# Patient Record
Sex: MALE | Race: BLACK OR AFRICAN AMERICAN | Employment: UNEMPLOYED | URBAN - METROPOLITAN AREA
[De-identification: names, ages, dates, MRNs, and addresses within clinical notes are randomized per-mention and may not be internally consistent; named-entity substitution may affect disease eponyms.]

---

## 2024-11-20 ENCOUNTER — APPOINTMENT (OUTPATIENT)
Dept: MRI IMAGING | Facility: HOSPITAL | Age: 49
End: 2024-11-20
Attending: STUDENT IN AN ORGANIZED HEALTH CARE EDUCATION/TRAINING PROGRAM
Payer: MEDICAID

## 2024-11-20 ENCOUNTER — APPOINTMENT (OUTPATIENT)
Dept: CT IMAGING | Facility: HOSPITAL | Age: 49
DRG: 057 | End: 2024-11-20
Attending: STUDENT IN AN ORGANIZED HEALTH CARE EDUCATION/TRAINING PROGRAM
Payer: MEDICAID

## 2024-11-20 ENCOUNTER — HOSPITAL ENCOUNTER (INPATIENT)
Facility: HOSPITAL | Age: 49
LOS: 1 days | Discharge: HOME | DRG: 057 | End: 2024-11-21
Attending: STUDENT IN AN ORGANIZED HEALTH CARE EDUCATION/TRAINING PROGRAM | Admitting: HOSPITALIST
Payer: MEDICAID

## 2024-11-20 ENCOUNTER — APPOINTMENT (OUTPATIENT)
Dept: MRI IMAGING | Facility: HOSPITAL | Age: 49
DRG: 057 | End: 2024-11-20
Attending: STUDENT IN AN ORGANIZED HEALTH CARE EDUCATION/TRAINING PROGRAM
Payer: MEDICAID

## 2024-11-20 ENCOUNTER — APPOINTMENT (OUTPATIENT)
Dept: CT IMAGING | Facility: HOSPITAL | Age: 49
End: 2024-11-20
Attending: STUDENT IN AN ORGANIZED HEALTH CARE EDUCATION/TRAINING PROGRAM
Payer: MEDICAID

## 2024-11-20 ENCOUNTER — HOSPITAL ENCOUNTER (INPATIENT)
Facility: HOSPITAL | Age: 49
LOS: 1 days | Discharge: HOME | End: 2024-11-21
Attending: STUDENT IN AN ORGANIZED HEALTH CARE EDUCATION/TRAINING PROGRAM | Admitting: HOSPITALIST
Payer: MEDICAID

## 2024-11-20 DIAGNOSIS — R27.0 ATAXIA OF RIGHT UPPER EXTREMITY: Primary | ICD-10-CM

## 2024-11-20 LAB
ANION GAP SERPL CALC-SCNC: 6 MMOL/L (ref 0–18)
BASOPHILS # BLD AUTO: 0.04 X10(3) UL (ref 0–0.2)
BASOPHILS NFR BLD AUTO: 0.6 %
BUN BLD-MCNC: 14 MG/DL (ref 9–23)
BUN/CREAT SERPL: 12.3 (ref 10–20)
CALCIUM BLD-MCNC: 9.7 MG/DL (ref 8.7–10.4)
CHLORIDE SERPL-SCNC: 100 MMOL/L (ref 98–112)
CO2 SERPL-SCNC: 27 MMOL/L (ref 21–32)
CREAT BLD-MCNC: 1.14 MG/DL
DEPRECATED RDW RBC AUTO: 37.2 FL (ref 35.1–46.3)
EGFRCR SERPLBLD CKD-EPI 2021: 79 ML/MIN/1.73M2 (ref 60–?)
EOSINOPHIL # BLD AUTO: 0.09 X10(3) UL (ref 0–0.7)
EOSINOPHIL NFR BLD AUTO: 1.5 %
ERYTHROCYTE [DISTWIDTH] IN BLOOD BY AUTOMATED COUNT: 13 % (ref 11–15)
EST. AVERAGE GLUCOSE BLD GHB EST-MCNC: 378 MG/DL (ref 68–126)
GLUCOSE BLD-MCNC: 375 MG/DL (ref 70–99)
GLUCOSE BLDC GLUCOMTR-MCNC: 325 MG/DL (ref 70–99)
GLUCOSE BLDC GLUCOMTR-MCNC: 330 MG/DL (ref 70–99)
HBA1C MFR BLD: 14.8 % (ref ?–5.7)
HCT VFR BLD AUTO: 39 %
HGB BLD-MCNC: 13.3 G/DL
IMM GRANULOCYTES # BLD AUTO: 0.01 X10(3) UL (ref 0–1)
IMM GRANULOCYTES NFR BLD: 0.2 %
LYMPHOCYTES # BLD AUTO: 1.85 X10(3) UL (ref 1–4)
LYMPHOCYTES NFR BLD AUTO: 29.9 %
MCH RBC QN AUTO: 27 PG (ref 26–34)
MCHC RBC AUTO-ENTMCNC: 34.1 G/DL (ref 31–37)
MCV RBC AUTO: 79.1 FL
MONOCYTES # BLD AUTO: 0.51 X10(3) UL (ref 0.1–1)
MONOCYTES NFR BLD AUTO: 8.3 %
NEUTROPHILS # BLD AUTO: 3.68 X10 (3) UL (ref 1.5–7.7)
NEUTROPHILS # BLD AUTO: 3.68 X10(3) UL (ref 1.5–7.7)
NEUTROPHILS NFR BLD AUTO: 59.5 %
OSMOLALITY SERPL CALC.SUM OF ELEC: 292 MOSM/KG (ref 275–295)
PLATELET # BLD AUTO: 330 10(3)UL (ref 150–450)
POTASSIUM SERPL-SCNC: 4.3 MMOL/L (ref 3.5–5.1)
RBC # BLD AUTO: 4.93 X10(6)UL
SODIUM SERPL-SCNC: 133 MMOL/L (ref 136–145)
WBC # BLD AUTO: 6.2 X10(3) UL (ref 4–11)

## 2024-11-20 PROCEDURE — 82962 GLUCOSE BLOOD TEST: CPT

## 2024-11-20 PROCEDURE — 80048 BASIC METABOLIC PNL TOTAL CA: CPT | Performed by: STUDENT IN AN ORGANIZED HEALTH CARE EDUCATION/TRAINING PROGRAM

## 2024-11-20 PROCEDURE — 93005 ELECTROCARDIOGRAM TRACING: CPT

## 2024-11-20 PROCEDURE — 83036 HEMOGLOBIN GLYCOSYLATED A1C: CPT | Performed by: HOSPITALIST

## 2024-11-20 PROCEDURE — 36415 COLL VENOUS BLD VENIPUNCTURE: CPT

## 2024-11-20 PROCEDURE — 70553 MRI BRAIN STEM W/O & W/DYE: CPT | Performed by: STUDENT IN AN ORGANIZED HEALTH CARE EDUCATION/TRAINING PROGRAM

## 2024-11-20 PROCEDURE — 99285 EMERGENCY DEPT VISIT HI MDM: CPT

## 2024-11-20 PROCEDURE — A9575 INJ GADOTERATE MEGLUMI 0.1ML: HCPCS | Performed by: STUDENT IN AN ORGANIZED HEALTH CARE EDUCATION/TRAINING PROGRAM

## 2024-11-20 PROCEDURE — 93010 ELECTROCARDIOGRAM REPORT: CPT

## 2024-11-20 PROCEDURE — 70496 CT ANGIOGRAPHY HEAD: CPT | Performed by: STUDENT IN AN ORGANIZED HEALTH CARE EDUCATION/TRAINING PROGRAM

## 2024-11-20 PROCEDURE — 85025 COMPLETE CBC W/AUTO DIFF WBC: CPT | Performed by: STUDENT IN AN ORGANIZED HEALTH CARE EDUCATION/TRAINING PROGRAM

## 2024-11-20 PROCEDURE — 70498 CT ANGIOGRAPHY NECK: CPT | Performed by: STUDENT IN AN ORGANIZED HEALTH CARE EDUCATION/TRAINING PROGRAM

## 2024-11-20 PROCEDURE — 70551 MRI BRAIN STEM W/O DYE: CPT | Performed by: STUDENT IN AN ORGANIZED HEALTH CARE EDUCATION/TRAINING PROGRAM

## 2024-11-20 RX ORDER — ACETAMINOPHEN 325 MG/1
650 TABLET ORAL EVERY 4 HOURS PRN
Status: DISCONTINUED | OUTPATIENT
Start: 2024-11-20 | End: 2024-11-21

## 2024-11-20 RX ORDER — METFORMIN HYDROCHLORIDE 500 MG/1
1000 TABLET, EXTENDED RELEASE ORAL 2 TIMES DAILY WITH MEALS
COMMUNITY
Start: 2023-06-13

## 2024-11-20 RX ORDER — LISINOPRIL 10 MG/1
10 TABLET ORAL DAILY
Status: DISCONTINUED | OUTPATIENT
Start: 2024-11-20 | End: 2024-11-21

## 2024-11-20 RX ORDER — GADOTERATE MEGLUMINE 376.9 MG/ML
20 INJECTION INTRAVENOUS
Status: COMPLETED | OUTPATIENT
Start: 2024-11-20 | End: 2024-11-20

## 2024-11-20 RX ORDER — POLYETHYLENE GLYCOL 3350 17 G/17G
17 POWDER, FOR SOLUTION ORAL DAILY PRN
Status: DISCONTINUED | OUTPATIENT
Start: 2024-11-20 | End: 2024-11-21

## 2024-11-20 RX ORDER — ACETAMINOPHEN 500 MG
500 TABLET ORAL EVERY 4 HOURS PRN
Status: DISCONTINUED | OUTPATIENT
Start: 2024-11-20 | End: 2024-11-21

## 2024-11-20 RX ORDER — ENOXAPARIN SODIUM 100 MG/ML
40 INJECTION SUBCUTANEOUS DAILY
Status: DISCONTINUED | OUTPATIENT
Start: 2024-11-21 | End: 2024-11-21

## 2024-11-20 RX ORDER — ORAL SEMAGLUTIDE 7 MG/1
7 TABLET ORAL DAILY
COMMUNITY
Start: 2024-11-19

## 2024-11-20 RX ORDER — LISINOPRIL 10 MG/1
10 TABLET ORAL DAILY
COMMUNITY

## 2024-11-20 RX ORDER — GLIPIZIDE 5 MG/1
5 TABLET ORAL
COMMUNITY

## 2024-11-20 RX ORDER — SENNOSIDES 8.6 MG
17.2 TABLET ORAL NIGHTLY PRN
Status: DISCONTINUED | OUTPATIENT
Start: 2024-11-20 | End: 2024-11-21

## 2024-11-20 RX ORDER — NICOTINE POLACRILEX 4 MG
15 LOZENGE BUCCAL
Status: DISCONTINUED | OUTPATIENT
Start: 2024-11-20 | End: 2024-11-21

## 2024-11-20 RX ORDER — SODIUM CHLORIDE 9 MG/ML
INJECTION, SOLUTION INTRAVENOUS CONTINUOUS
Status: DISCONTINUED | OUTPATIENT
Start: 2024-11-20 | End: 2024-11-21

## 2024-11-20 RX ORDER — ONDANSETRON 2 MG/ML
4 INJECTION INTRAMUSCULAR; INTRAVENOUS EVERY 6 HOURS PRN
Status: DISCONTINUED | OUTPATIENT
Start: 2024-11-20 | End: 2024-11-21

## 2024-11-20 RX ORDER — HYDROCODONE BITARTRATE AND ACETAMINOPHEN 5; 325 MG/1; MG/1
2 TABLET ORAL EVERY 4 HOURS PRN
Status: DISCONTINUED | OUTPATIENT
Start: 2024-11-20 | End: 2024-11-21

## 2024-11-20 RX ORDER — DEXTROSE MONOHYDRATE 25 G/50ML
50 INJECTION, SOLUTION INTRAVENOUS
Status: DISCONTINUED | OUTPATIENT
Start: 2024-11-20 | End: 2024-11-21

## 2024-11-20 RX ORDER — PANTOPRAZOLE SODIUM 20 MG/1
20 TABLET, DELAYED RELEASE ORAL DAILY
COMMUNITY
Start: 2024-09-05

## 2024-11-20 RX ORDER — HYDROCODONE BITARTRATE AND ACETAMINOPHEN 5; 325 MG/1; MG/1
1 TABLET ORAL EVERY 4 HOURS PRN
Status: DISCONTINUED | OUTPATIENT
Start: 2024-11-20 | End: 2024-11-21

## 2024-11-20 RX ORDER — NICOTINE POLACRILEX 4 MG
30 LOZENGE BUCCAL
Status: DISCONTINUED | OUTPATIENT
Start: 2024-11-20 | End: 2024-11-21

## 2024-11-20 RX ORDER — PROCHLORPERAZINE EDISYLATE 5 MG/ML
5 INJECTION INTRAMUSCULAR; INTRAVENOUS EVERY 8 HOURS PRN
Status: DISCONTINUED | OUTPATIENT
Start: 2024-11-20 | End: 2024-11-21

## 2024-11-20 RX ORDER — BISACODYL 10 MG
10 SUPPOSITORY, RECTAL RECTAL
Status: DISCONTINUED | OUTPATIENT
Start: 2024-11-20 | End: 2024-11-21

## 2024-11-20 RX ORDER — PANTOPRAZOLE SODIUM 20 MG/1
20 TABLET, DELAYED RELEASE ORAL DAILY
Status: DISCONTINUED | OUTPATIENT
Start: 2024-11-20 | End: 2024-11-21

## 2024-11-20 NOTE — ED INITIAL ASSESSMENT (HPI)
Pt arrives ambulatory to ED for R arm weakness/issue. Pt states he is \"having difficulty controlling my right arm\" intermittently since Saturday. Pt denies numbness/weakness. Aox4, speaking in full sentences.

## 2024-11-21 VITALS
OXYGEN SATURATION: 99 % | RESPIRATION RATE: 16 BRPM | TEMPERATURE: 99 F | BODY MASS INDEX: 37.33 KG/M2 | DIASTOLIC BLOOD PRESSURE: 73 MMHG | SYSTOLIC BLOOD PRESSURE: 132 MMHG | HEIGHT: 69 IN | WEIGHT: 252 LBS | HEART RATE: 89 BPM

## 2024-11-21 LAB
ANION GAP SERPL CALC-SCNC: 6 MMOL/L (ref 0–18)
ATRIAL RATE: 91 BPM
BASOPHILS # BLD AUTO: 0.02 X10(3) UL (ref 0–0.2)
BASOPHILS NFR BLD AUTO: 0.3 %
BUN BLD-MCNC: 13 MG/DL (ref 9–23)
BUN/CREAT SERPL: 14 (ref 10–20)
CALCIUM BLD-MCNC: 9.1 MG/DL (ref 8.7–10.4)
CHLORIDE SERPL-SCNC: 103 MMOL/L (ref 98–112)
CO2 SERPL-SCNC: 27 MMOL/L (ref 21–32)
CREAT BLD-MCNC: 0.93 MG/DL
DEPRECATED RDW RBC AUTO: 37.3 FL (ref 35.1–46.3)
EGFRCR SERPLBLD CKD-EPI 2021: 101 ML/MIN/1.73M2 (ref 60–?)
EOSINOPHIL # BLD AUTO: 0.09 X10(3) UL (ref 0–0.7)
EOSINOPHIL NFR BLD AUTO: 1.6 %
ERYTHROCYTE [DISTWIDTH] IN BLOOD BY AUTOMATED COUNT: 12.9 % (ref 11–15)
GLUCOSE BLD-MCNC: 308 MG/DL (ref 70–99)
GLUCOSE BLDC GLUCOMTR-MCNC: 277 MG/DL (ref 70–99)
GLUCOSE BLDC GLUCOMTR-MCNC: 294 MG/DL (ref 70–99)
HCT VFR BLD AUTO: 34.5 %
HGB BLD-MCNC: 12.2 G/DL
IMM GRANULOCYTES # BLD AUTO: 0.01 X10(3) UL (ref 0–1)
IMM GRANULOCYTES NFR BLD: 0.2 %
LYMPHOCYTES # BLD AUTO: 1.84 X10(3) UL (ref 1–4)
LYMPHOCYTES NFR BLD AUTO: 31.7 %
MCH RBC QN AUTO: 28.3 PG (ref 26–34)
MCHC RBC AUTO-ENTMCNC: 35.4 G/DL (ref 31–37)
MCV RBC AUTO: 80 FL
MONOCYTES # BLD AUTO: 0.56 X10(3) UL (ref 0.1–1)
MONOCYTES NFR BLD AUTO: 9.7 %
NEUTROPHILS # BLD AUTO: 3.28 X10 (3) UL (ref 1.5–7.7)
NEUTROPHILS # BLD AUTO: 3.28 X10(3) UL (ref 1.5–7.7)
NEUTROPHILS NFR BLD AUTO: 56.5 %
OSMOLALITY SERPL CALC.SUM OF ELEC: 294 MOSM/KG (ref 275–295)
P AXIS: 29 DEGREES
P-R INTERVAL: 160 MS
PLATELET # BLD AUTO: 274 10(3)UL (ref 150–450)
POTASSIUM SERPL-SCNC: 4 MMOL/L (ref 3.5–5.1)
Q-T INTERVAL: 356 MS
QRS DURATION: 98 MS
QTC CALCULATION (BEZET): 437 MS
R AXIS: -40 DEGREES
RBC # BLD AUTO: 4.31 X10(6)UL
SODIUM SERPL-SCNC: 136 MMOL/L (ref 136–145)
T AXIS: 1 DEGREES
VENTRICULAR RATE: 91 BPM
WBC # BLD AUTO: 5.8 X10(3) UL (ref 4–11)

## 2024-11-21 PROCEDURE — 97161 PT EVAL LOW COMPLEX 20 MIN: CPT

## 2024-11-21 PROCEDURE — 80048 BASIC METABOLIC PNL TOTAL CA: CPT | Performed by: HOSPITALIST

## 2024-11-21 PROCEDURE — 82962 GLUCOSE BLOOD TEST: CPT

## 2024-11-21 PROCEDURE — 97530 THERAPEUTIC ACTIVITIES: CPT

## 2024-11-21 PROCEDURE — 85025 COMPLETE CBC W/AUTO DIFF WBC: CPT | Performed by: HOSPITALIST

## 2024-11-21 PROCEDURE — 97165 OT EVAL LOW COMPLEX 30 MIN: CPT

## 2024-11-21 PROCEDURE — 97116 GAIT TRAINING THERAPY: CPT

## 2024-11-21 PROCEDURE — 95819 EEG AWAKE AND ASLEEP: CPT

## 2024-11-21 RX ORDER — INSULIN DEGLUDEC 100 U/ML
5 INJECTION, SOLUTION SUBCUTANEOUS NIGHTLY
Status: DISCONTINUED | OUTPATIENT
Start: 2024-11-21 | End: 2024-11-21

## 2024-11-21 NOTE — DISCHARGE SUMMARY
[facility]   INTERNAL MEDICINE DISCHARGE SUMMARY    Name:     Marek Mcqueen  YOB: 1975  MRN:     [id number]    Admission Date:     11/20/2024 Discharge Date:     11/21/24      REASON FOR ADMISSION  Ataxia of right upper extremity    Primary care physician: No primary care provider on file.   Discharging physician: Stevie Sawant MD     HOSPITAL COURSE  DISCHARGE DIAGNOSES  PROCEDURES   Mr. Marek Mcqueen is a 50 y/o M w/ T2DM, HTN, HLD, JAZZ, and GERD who presented for weakness and decreased coordination     Patient following with primary care and endocrinology at [place]. Was recently seen in clinic for dizziness. Thought potentially due to anxiety/panic attack, possibly blood sugar (unsure since patient did not check during event). Dizziness resolved, continued to have on and off difficulty with right arm, sometimes feeling like his coordination was slightly off. Resolves spontaneously after happening. This had not occurred until a few days prior to admission. On admission - got MRI brain wihtout acute findings explaining symptoms. CTA head neck without LVO or significant stenosis. Neuro consulted,  completed EEG without seizure like activity. Was recommended to get LP per neuro, however patient declined at this time. Unclear cause of symptoms, patient prefers to follow up.     Of note sugars were elevated during admission. Discussed with patient, has endocrinology appointment 11/25/24. Emphasized importance of glucose control. Instructed to follow up with PCP, neuro, and endocrine.     Decreased coordination  - recent episode of dizziness PTA  - on and off decreased coordination causing him to present to ED  - unclear cause after workup - stroke and seizure workup completed per neuro without acute cause:  - MRI brain without acute hemorrhage or infarct  - CTA head/neck without occlusion or significant stenosis  - eeg without seizure activity  - neuro consulted   - would also recommend LP,  patient declines   - follow up OP  - monitored on telemetry  - PT/OT ordered - OP Therapy recommended     T2DM c/b hyperglycemia  - A1c 14.8 on admission  - on glipizide, metformin, rybelus  - SSI inpatient , start glargine if remains inpatient  - due to continued elevation in sugars, would recommend starting insulin - patient to discuss with his endocrinologist 11/25 at appointment     Chronic medical conditions:  - HTN: continue lisinopril  - HLD: crestor  - JAZZ: CPAP  - GERD: PPI    CONSULTATIONS   IP CONSULT TO NEUROLOGY    IMPORTANT FOLLOW UP  Marisol Agustin  [address] [facility] [address]  [phone]    Schedule an appointment as soon as possible for a visit  Diabetes follow up within 1-3 weeks    Arnold Moore MD  [facility] [place] [address]   [phone]  Schedule an appointment as soon as possible for a visit in 1 week(s)       Discharge Instructions         Mr Mcqueen,    Zach were treated for decreased coordination.    Your MRI's did not show any abnormalities    Please continue your medications as prescribed.    Please follow up with PCP and neurology.    Your blood sugars were elevated - you will need further control with your endocrinologist. Please follow up with them as scheduled on 11/25.              MEDICATIONS AT DISCHARGE     Medication List        CONTINUE taking these medications      glipiZIDE 5 MG Tabs  Commonly known as: Glucotrol     lisinopril 10 MG Tabs  Commonly known as: Zestril     metFORMIN  MG Tb24  Commonly known as: Glucophage XR     pantoprazole 20 MG Tbec  Commonly known as: Protonix     Rybelsus 7 MG Tabs  Generic drug: Semaglutide          PHYSICAL EXAMINATION  Vitals: BP (!) 152/92 (BP Location: Left arm)   Pulse 89   Temp 98.5 °F (36.9 °C) (Oral)   Resp 18   Ht 5' 9\" (1.753 m)   Wt 252 lb (114.3 kg)   SpO2 97%   BMI 37.21 kg/m²   Please see H&P    DISCHARGE CONDITION  Stable    DISCHARGE  LOCATION  home    DISCHARGE DIAGNOSES  Weakness  HTN  HLD  T2DM with hyperglycemia      Total time coordinating care: 75 minutes. (Hx, exam, chart review, discussing plan w/ patient/family, nursing/consultants). 50% of the time F2F for counseling, patient education, answering questions & coordination of care.    Patient's prior to admission medications were reviewed, all current medications reviewed. Patient's current and discharge medications have been reconciled and reviewed verbally with the patient and or family members.      Stevie Sawant MD   Internal Medicine - [facility]      Objective:    LABORATORY VALUES   Recent Labs   Lab 11/20/24  1651 11/21/24  0539   * 136   K 4.3 4.0    103   CO2 27.0 27.0   BUN 14 13   CREATSERUM 1.14 0.93   ANIONGAP 6 6   * 308*   CA 9.7 9.1   EGFRCR 79 101    Recent Labs   Lab 11/20/24  1651 11/21/24  0539   WBC 6.2 5.8   HGB 13.3 12.2*   HCT 39.0 34.5*   .0 274.0   MCV 79.1* 80.0   MOPERCENT 8.3 9.7   EOPERCENT 1.5 1.6   BAPERCENT 0.6 0.3      IMAGING, REPORTS, AND DIAGNOSTICS  MRI BRAIN (W+WO) (CPT=70553)    Result Date: 11/20/2024  CONCLUSION:  1. Normal pre and post intravenous gadolinium MRI scan of the brain. 2. Mild ethmoid sinusitis-probably chronic    Dictated by (CST): Moises Borrego MD on 11/20/2024 at 9:44 PM     Finalized by (CST): Moises Borrego MD on 11/20/2024 at 9:51 PM          CTA BRAIN + CTA CAROTIDS (CPT=70496/58155)    Result Date: 11/20/2024  CONCLUSION:  1. No major vessel occlusion, hemodynamically significant stenosis, dissection, AVM or aneurysm. 2. Mild atherosclerosis of internal carotids.  No hemodynamically significant stenosis. 3. No acute intracranial finding. 4. Mild right ethmoid sinusitis.     Dictated by (CST): Moises Borrego MD on 11/20/2024 at 6:57 PM     Finalized by (CST): Moises Borrego MD on 11/20/2024 at 7:04 PM          MRI BRAIN WO ACUTE (3) SEQUENCE (CPT=70551)    Result Date:  11/20/2024  CONCLUSION:  1. No acute infarct or hemorrhage. 2. Probable artifact related to tortuous vertebral artery accounting for inconsistent signal abnormality in the left side of the medulla.  If patient has persistent unexplained symptoms, follow-up complete pre and post intravenous gadolinium MRI scan recommended for confirmation.     Dictated by (CST): Moises Borrego MD on 11/20/2024 at 6:52 PM     Finalized by (CST): Moises Borrego MD on 11/20/2024 at 6:57 PM

## 2024-11-21 NOTE — ED QUICK NOTES
Rounding Completed    Plan of Care reviewed. Waiting for Admission.  Elimination needs assessed.  Patient resting in bed, speaking in full sentences. Pt on cardiac monitor for frequent VS assessments, NSR. No new requests at this time.     Bed is locked and in lowest position. Call light within reach.

## 2024-11-21 NOTE — DISCHARGE INSTRUCTIONS
Mr Richar,    You were treated for decreased coordination.    Your MRI's did not show any abnormalities    Please continue your medications as prescribed.    Please follow up with PCP and neurology.    Your blood sugars were elevated - you will need further control with your endocrinologist. Please follow up with them as scheduled on 11/25.

## 2024-11-21 NOTE — ED QUICK NOTES
This RN received report from Sara HOLT.    Rounding Completed    Plan of Care reviewed. Waiting for Mri results.  Elimination needs assessed.  Patient resting in bed, speaking in full sentences. Pt on cardiac monitor, for frequent VS assessments, NSR. No new requests at this time.  Respiratory effort good, even and unlabored. IV in place    Bed is locked and in lowest position. Call light within reach.

## 2024-11-21 NOTE — RESPIRATORY THERAPY NOTE
Patient to be placed on CPAP : No  Patient refused Yes    Comments:   Patient wears CPAP at home but would not like to wear one during his hospital stay. I let the patient know that if he changes his mind during his stay the CPAP is always available to him.

## 2024-11-21 NOTE — ED QUICK NOTES
Orders for admission, patient is aware of plan and ready to go upstairs. Any questions, please call ED RN Kimberley at extension [phone].     Patient Covid vaccination status: Fully vaccinated     COVID Test Ordered in ED: None    COVID Suspicion at Admission: N/A    Running Infusions:  None    Mental Status/LOC at time of transport: Aox4    Other pertinent information:   CIWA score: N/A   NIH score:  N/A

## 2024-11-21 NOTE — CM/SW NOTE
11/21/24 1500   Discharge disposition   Expected discharge disposition Home or Self   Outpatient services Outpatient rehab services   Discharge transportation Private car     Per chart, pt has DC order for today.    SW requested RN have MD enter order/Rx for Outpatient PT prior to pt leaving.    Pt is cleared from SW/CM stand point.      PLAN: Home w/ Outpatient PT          MIRIAM Medina, LSW [id number]

## 2024-11-21 NOTE — PROCEDURES
Procedure: 21-channel EEG  Referring Provider: Self  Reason for Study: R/o seizures  EKG: Yes      Date of Service: 11/21  Start: 14:13  End: 14:35  Duration 22 minutes      EEG Description:    Technical:  This is a routine  EEG obtained with standard placement of scalp electrodes utilizing the International 10-20 Electrode Placement system. The record was reviewed using digital reformatting with bipolar and referential montages.    Background:    Occipital rhythm (posterior dominant rhythm, or PDR): 11Hz         Organization: good    Reactivity to eye opening/closure:  Present  Other background activity:  None  Drowsiness: Yes  Sleep: Stage II  Comments:     Activation Procedures:    A. Hyperventilation: None  B. Photic stimulation: None  C. Reactivity to stimulation: None    Abnormalities:   Normal    EEG Diagnosis:   Normal awake and asleep EEG

## 2024-11-21 NOTE — PLAN OF CARE
Marek is A&Ox4 and on room air. Pt ambulates and voids independently, monitoring blood glucose levels per order- ACHS. Pt is saline locked and is tolerating diet. Patient denies pain. Frequent rounding by nursing staff. Safety precautions maintained/call light within reach. Patient discharged home with spouse and outpatient PT orders. IV removed, discharge education provided, patient sent with all personal belongings, and discharge instructions. Addressed additional questions.   Problem: Patient Centered Care  Goal: Patient preferences are identified and integrated in the patient's plan of care  Description: Interventions:  - What would you like us to know as we care for you? My right arm is weak.  - Provide timely, complete, and accurate information to patient/family  - Incorporate patient and family knowledge, values, beliefs, and cultural backgrounds into the planning and delivery of care  - Encourage patient/family to participate in care and decision-making at the level they choose  - Honor patient and family perspectives and choices  Outcome: Adequate for Discharge     Problem: Patient/Family Goals  Goal: Patient/Family Long Term Goal  Description: Patient's Long Term Goal: no more weakness    Interventions:  - pt/ot  - See additional Care Plan goals for specific interventions  Outcome: Adequate for Discharge  Goal: Patient/Family Short Term Goal  Description: Patient's Short Term Goal: go home    Interventions:   - increased strength   - See additional Care Plan goals for specific interventions  Outcome: Adequate for Discharge     Problem: SAFETY ADULT - FALL  Goal: Free from fall injury  Description: INTERVENTIONS:  - Assess pt frequently for physical needs  - Identify cognitive and physical deficits and behaviors that affect risk of falls.  - [place] fall precautions as indicated by assessment.  - Educate pt/family on patient safety including physical limitations  - Instruct pt to call for assistance with  activity based on assessment  - Modify environment to reduce risk of injury  - Provide assistive devices as appropriate  - Consider OT/PT consult to assist with strengthening/mobility  - Encourage toileting schedule  Outcome: Adequate for Discharge     Problem: MUSCULOSKELETAL - ADULT  Goal: Return mobility to safest level of function  Description: INTERVENTIONS:  - Assess patient stability and activity tolerance for standing, transferring and ambulating w/ or w/o assistive devices  - Assist with transfers and ambulation using safe patient handling equipment as needed  - Ensure adequate protection for wounds/incisions during mobilization  - Obtain PT/OT consults as needed  - Advance activity as appropriate  - Communicate ordered activity level and limitations with patient/family  Outcome: Adequate for Discharge  Goal: Maintain proper alignment of affected body part  Description: INTERVENTIONS:  - Support and protect limb and body alignment per provider's orders  - Instruct and reinforce with patient and family use of appropriate assistive device and precautions (e.g. spinal or hip dislocation precautions)  Outcome: Adequate for Discharge

## 2024-11-21 NOTE — ED QUICK NOTES
Mri approves previous MRI screening form from today for new MRI order, per MRI tech, no need to fill out the second form.

## 2024-11-21 NOTE — ED PROVIDER NOTES
Patient Seen in: [facility] 4w/sw/se      History     Chief Complaint   Patient presents with    Weakness    Other     Stated Complaint: Right arm/ sholder problem    Subjective:   HPI    49-year-old male with history of hypertension GERD diabetes mellitus presenting for evaluation of difficulty using right upper extremity.  He is right-hand dominant.  He states that he had intermittent issues since Saturday.  Where he has several minutes of decreased dexterity in the right upper extremity.  Also noticed a similar issue in the right leg which has been intermittent.  Presently he is asymptomatic.  No headache.  No visual changes.  No neck pain.  No falls or trauma to the head.  Is a poorly controlled diabetic.  Has not checked her sugars in the last month.      Objective:     Past Medical History:    Diabetes (HCC)    Esophageal reflux    Essential hypertension              History reviewed. No pertinent surgical history.             Social History     Socioeconomic History    Marital status:    Tobacco Use    Smoking status: Never    Smokeless tobacco: Never     Social Drivers of Health     Financial Resource Strain: Low Risk  (4/30/2024)    Received from [facility]    Overall Financial Resource Strain (CARDIA)     Difficulty of Paying Living Expenses: Not very hard   Food Insecurity: No Food Insecurity (11/20/2024)    Food Insecurity     Food Insecurity: Never true   Transportation Needs: No Transportation Needs (11/20/2024)    Transportation Needs     Lack of Transportation: No   Housing Stability: Low Risk  (11/20/2024)    Housing Stability     Housing Instability: No                  Physical Exam     ED Triage Vitals [11/20/24 1604]   /87   Pulse 93   Resp 18   Temp 97.1 °F (36.2 °C)   Temp src Temporal   SpO2 99 %   O2 Device None (Room air)       Current Vitals:   Vital Signs  BP: (!) 168/99  Pulse: 82  Resp: 20  Temp: 98.4 °F (36.9 °C)  Temp src: Oral  MAP (mmHg):  (!) 117    Oxygen Therapy  SpO2: 98 %  O2 Device: None (Room air)        Physical Exam  Constitutional: awake, alert, no sig distress  HENT: mmm, no lesions,  Neck: normal range of motion, no tenderness, supple.  Eyes: PERRL, EOMI, conjunctiva normal, no discharge. Sclera anicteric.  Cardiovascular: rr no murmur  Respiratory: Normal breath sounds, no respiratory distress, no wheezing, no chest tenderness.  GI: Bowel sounds normal, Soft, no tenderness, no masses, no pulsatile masses.  : No CVA tenderness.  Skin: Warm, dry, no erythema, no rash.  Musculoskeletal: Intact distal pulses, no edema, no tenderness, no cyanosis, no clubbing. Good range of motion in all major joints. No tenderness to palpation or major deformities noted. Back- No tenderness.  Neurologic: Alert & oriented x 3, normal motor function, normal sensory function, no focal deficits noted.  -mild dysmetria on FNF with RUE, normal proprioception to joint position, normal sensation  Psych: Calm, cooperative, nl affect        ED Course     Labs Reviewed   BASIC METABOLIC PANEL (8) - Abnormal; Notable for the following components:       Result Value    Glucose 375 (*)     Sodium 133 (*)     All other components within normal limits   CBC WITH DIFFERENTIAL WITH PLATELET - Abnormal; Notable for the following components:    MCV 79.1 (*)     All other components within normal limits   HEMOGLOBIN A1C - Abnormal; Notable for the following components:    HgbA1C 14.8 (*)     Estimated Average Glucose 378 (*)     All other components within normal limits   POCT GLUCOSE - Abnormal; Notable for the following components:    POC Glucose  325 (*)     All other components within normal limits   BASIC METABOLIC PANEL (8)   CBC WITH DIFFERENTIAL WITH PLATELET     EKG    Rate, intervals and axes as noted on EKG Report.  Rate: 91  Rhythm: Sinus Rhythm  Reading: SR with PAC, left axis, lvh changes, no st change no stemi. Qtc 437                       MDM      49M hx as  above presenting with intermittent decreased coordination and dexterity of the right upper and occasionally right lower extremity.  On arrival he is hypertensive other vitals are stable and reassuring.  DDx includes metabolic derangements, acute ischemic stroke TIA, demyelinating disease such as MS  Plan labs CTA MRI brain acute series      Independently reviewed patient CTA no large vessel occlusion or dissection    MRI without acute stroke, does have signal abnormality in left medulla, which does correlate to patient's symptoms, may be artifactual.  Discussed with neurology who recommends MRI brain without contrast admission.  Discussed with hospitalist.  Admission disposition: 11/20/2024  8:42 PM           Medical Decision Making      Disposition and Plan     Clinical Impression:  1. Ataxia of right upper extremity         Disposition:  Admit  11/20/2024  8:42 pm    Follow-up:  No follow-up provider specified.        Medications Prescribed:  Current Discharge Medication List              Supplementary Documentation:         Hospital Problems       Present on Admission             ICD-10-CM Noted POA    * (Principal) Ataxia of right upper extremity R27.0 11/20/2024 Unknown

## 2024-11-21 NOTE — CM/SW NOTE
Per chart, pt is from home w/ spouse. Pt is on RA w/ stable O2 sats. Pt is also documented as AOX4 and ambulating independently in his room.    SW consulted w/ PT Becky and OT Rama - confirmed therapy needs anticipated at this time.    UPDATE: Per PT note, Anticipated therapy need: Home with Outpatient Rehab    MD to enter order/Rx for Outpatient PT prior to DC.    PLAN: Home w/ Outpatient PT - pending Rx & med clear      SW/CM to remain available for support and/or discharge planning.       Carol, MSW, LSW [id number]

## 2024-11-21 NOTE — H&P
[facility]   INTERNAL MEDICINE HISTORY & PHYSICAL      CHIEF COMPLAINT   Weakness and Other    HISTORY OF THE PRESENT ILLNESS    Mr. Marek Mcqueen is a 50 y/o M w/ T2DM, HTN, HLD, JAZZ, and GERD who presented for weakness and decreased coordination    Patient following with primary care and endocrinology at [place]. Was recently seen in clinic for dizziness. Thought potentially due to anxiety/panic attack, possibly blood sugar (unsure since patient did not check during event). Dizziness resolved, continued to have on and off difficulty with right arm, sometimes feeling like his coordination was slightly off. Resolves spontaneously after happening. This had not occurred until a few days prior to admission.    Seen on admission. Patient feels well when seen. No headache, vision changes, or other weakness. No fever or chills. Has not had recent infections. Has been taking medications as prescribed.     On admission:  - VS: afebrile, HR 84, RR 22, /80  - labs notable for glucose 375, cbc wnl, hgb A1c 14.8  - MRI brain  w/out infarct or hemorrhage, CTA head/neck w/out occlusion or significant stenosis, mild atherosclerosis of internal carotids, MRI brain w+w/out wnl  - neuro consulted    REVIEW OF SYSTEMS   Remainder of 12-point ROS negative unless discussed in HPI.     PATIENT HISTORIES  PMHx:  He has a past medical history of Diabetes (HCC), Esophageal reflux, and Essential hypertension.  PSHx:  He has no past surgical history on file.   FHx:  His family history is not on file.   SHx: He reports that he has never smoked. He has never used smokeless tobacco.    Allergies: He has No Known Allergies.     Current Outpatient Medications   Medication Instructions    glipiZIDE (GLUCOTROL) 5 mg, Every morning before breakfast    lisinopril (ZESTRIL) 10 mg, Daily    metFORMIN ER (GLUCOPHAGE XR) 1,000 mg, 2 times daily with meals    pantoprazole (PROTONIX) 20 mg, Daily    Rybelsus 7 mg, Daily       PHYSICAL  EXAMINATION   Vitals: BP (!) 152/92 (BP Location: Left arm)   Pulse 95   Temp 98.5 °F (36.9 °C) (Oral)   Resp 18   Ht 5' 9\" (1.753 m)   Wt 252 lb (114.3 kg)   SpO2 97%   BMI 37.21 kg/m²   Gen: NAD, well nourished  Eyes: PERRLA, normal conjunctivae  ENMT: Moist mucous membranes, trachea midline, no pharyngeal erythema, no thyromegaly  CV: RRR, no M/R/G, no peripheral edema  Resp: CTAB, non-labored respirations, symmetric expansion  GI: Soft, NT, ND, + BS, no masses, no HSM  MSK:  No C/C/E, normal active/passive ROM in C-spine, 5/5 strength in all extremities  Skin: No rashes, visible skin w/o worrisome lesions   Neuro: CN 2-12 grossly intact, sensation intact   Psych: A&Ox3, appropriate mood, conversant w/ linear thought process     DATA REVIEW: LABORATORY VALUES & DIAGNOSTICS  Recent Labs   Lab 11/20/24  1651 11/21/24  0539   * 136   K 4.3 4.0    103   CO2 27.0 27.0   BUN 14 13   CREATSERUM 1.14 0.93   ANIONGAP 6 6   * 308*   CA 9.7 9.1   EGFRCR 79 101     Recent Labs   Lab 11/20/24  1651 11/21/24  0539   WBC 6.2 5.8   HGB 13.3 12.2*   HCT 39.0 34.5*   .0 274.0   MCV 79.1* 80.0   MOPERCENT 8.3 9.7   EOPERCENT 1.5 1.6   BAPERCENT 0.6 0.3     ASSESSMENT & PLAN   Mr. Marek Mcqueen is a 50 y/o M w/ T2DM, HTN, HLD, JAZZ, and GERD who presented for weakness and decreased coordination    Decreased coordination  C/F TIA  - recent episode of dizziness PTA  - on and off decreased coordination causing him to present to ED  - unclear if due to tia, dehydration, peripheral neuropathy, seizure activity  - MRI brain without acute hemorrhage or infarct  - CTA head/neck without occlusion or significant stenosis  - neuro consulted   - eeg ordered   - patient considering LP  - monitor on telemetry  - PT/OT ordered - OP Therapy recommended    T2DM c/b hyperglycemia  - A1c 14.8 on admission  - on glipizide, metformin, rybelus  - SSI inpatient , start glargine if remains inpatient  - due to continued  elevation in sugars, would recommend starting insulin - patient to discuss with his endocrinologist 11/25 at appointment    Chronic medical conditions:  - HTN: continue lisinopril  - HLD: crestor  - JAZZ: CPAP  - GERD: PPI    Ppx: enox  Dispo  EDoD: ~   pending neurological workup  F/u:   - PCP: No primary care provider on file.     Stevie Sawant MD  Internal Medicine - [place] [facility]ist  [facility]

## 2024-11-21 NOTE — PLAN OF CARE
Patient is alert and oriented x4. Tolerating diet, accucheck ACHS. IVF infusing. Voiding freely. Family at bedside. Call light within reach.    Problem: Patient Centered Care  Goal: Patient preferences are identified and integrated in the patient's plan of care  Description: Interventions:  - What would you like us to know as we care for you? My right arm is weak.  - Provide timely, complete, and accurate information to patient/family  - Incorporate patient and family knowledge, values, beliefs, and cultural backgrounds into the planning and delivery of care  - Encourage patient/family to participate in care and decision-making at the level they choose  - Honor patient and family perspectives and choices  Outcome: Progressing     Problem: Patient/Family Goals  Goal: Patient/Family Long Term Goal  Description: Patient's Long Term Goal: no more weakness    Interventions:  - pt/ot  - See additional Care Plan goals for specific interventions  Outcome: Progressing  Goal: Patient/Family Short Term Goal  Description: Patient's Short Term Goal: go home    Interventions:   - increased strength   - See additional Care Plan goals for specific interventions  Outcome: Progressing     Problem: SAFETY ADULT - FALL  Goal: Free from fall injury  Description: INTERVENTIONS:  - Assess pt frequently for physical needs  - Identify cognitive and physical deficits and behaviors that affect risk of falls.  - [place] fall precautions as indicated by assessment.  - Educate pt/family on patient safety including physical limitations  - Instruct pt to call for assistance with activity based on assessment  - Modify environment to reduce risk of injury  - Provide assistive devices as appropriate  - Consider OT/PT consult to assist with strengthening/mobility  - Encourage toileting schedule  Outcome: Progressing     Problem: MUSCULOSKELETAL - ADULT  Goal: Return mobility to safest level of function  Description: INTERVENTIONS:  - Assess patient  stability and activity tolerance for standing, transferring and ambulating w/ or w/o assistive devices  - Assist with transfers and ambulation using safe patient handling equipment as needed  - Ensure adequate protection for wounds/incisions during mobilization  - Obtain PT/OT consults as needed  - Advance activity as appropriate  - Communicate ordered activity level and limitations with patient/family  Outcome: Progressing  Goal: Maintain proper alignment of affected body part  Description: INTERVENTIONS:  - Support and protect limb and body alignment per provider's orders  - Instruct and reinforce with patient and family use of appropriate assistive device and precautions (e.g. spinal or hip dislocation precautions)  Outcome: Progressing

## 2024-11-21 NOTE — PHYSICAL THERAPY NOTE
PHYSICAL THERAPY EVALUATION - INPATIENT     Room Number: 454/454-A  Evaluation Date: 2024  Type of Evaluation: Initial   Physician Order: PT Eval and Treat    Presenting Problem: ataxia RUE     Reason for Therapy: Mobility Dysfunction and Discharge Planning    PHYSICAL THERAPY ASSESSMENT   Patient is a 49 year old male admitted 2024 for ataxia RUE.  Prior to admission, patient's baseline is independent in ADL's and ambulation with no assistive device.  Patient is currently functioning at baseline with bed mobility, transfers, and gait.  Patient is requiring independent as a result of the following impairments: medical status.  Physical Therapy will continue to follow for duration of hospitalization.    Patient will benefit from continued skilled PT Services at discharge to promote prior level of function.  Anticipate patient will return home with OP PT.    PLAN DURING HOSPITALIZATION  Nursing Mobility Recommendation : 1 Assist  PT Device Recommendation: None              PHYSICAL THERAPY MEDICAL/SOCIAL HISTORY   Problem List  Principal Problem:    Ataxia of right upper extremity    HOME SITUATION  Type of Home: House  Home Layout: One level  Stairs to Enter : 0   Railing: No    Stairs to Bedroom: 0    Railing: No    Lives With: Spouse    Drives: Yes   Patient Regularly Uses: None     SUBJECTIVE  \"It comes and goes\"    PHYSICAL THERAPY EXAMINATION   OBJECTIVE  Precautions: None  Fall Risk: Standard fall risk    PAIN ASSESSMENT  Ratin          COGNITION  Overall Cognitive Status:  WFL - within functional limits    RANGE OF MOTION AND STRENGTH ASSESSMENT  Lower extremity ROM is within functional limits  BLE WNL  Lower extremity strength is within functional limits  BLE WN:    BALANCE  Static Sitting: Good  Dynamic Sitting: Good  Static Standing: Good  Dynamic Standing: Fair +    AM-PAC '6-Clicks' INPATIENT SHORT FORM - BASIC MOBILITY  How much difficulty does the patient currently have...  Patient  Difficulty: Turning over in bed (including adjusting bedclothes, sheets and blankets)?: None   Patient Difficulty: Sitting down on and standing up from a chair with arms (e.g., wheelchair, bedside commode, etc.): None   Patient Difficulty: Moving from lying on back to sitting on the side of the bed?: None   How much help from another person does the patient currently need...   Help from Another: Moving to and from a bed to a chair (including a wheelchair)?: None   Help from Another: Need to walk in hospital room?: None   Help from Another: Climbing 3-5 steps with a railing?: None     AM-PAC Score:  Raw Score: 24   Approx Degree of Impairment: 0%   Standardized Score (AM-PAC Scale): 61.14   CMS Modifier (G-Code): CH    FUNCTIONAL ABILITY STATUS  Functional Mobility/Gait Assessment  Gait Assistance: Independent  Distance (ft): 200ft  Assistive Device: None  Rolling:  not tested  Supine to Sit: independent  Sit to Supine:  not tested   Sit to Stand: independent    Exercise/Education Provided:  Education Provided To: Patient  Patient Education: Role of Physical Therapy;Plan of Care;Gait Training  Patient's Response to Education: Verbalized Understanding           Skilled Therapy Provided: Patient on room air. Patient currently independent with mobility with no assistive device, shoes donned. Patient reports some issues moving his right arm, appears to be more motor planning in nature as opposed to ataxia, not able to  high five therapist on first attempt but able to do so on second attempt. Patient with no concerns for home at this time, encouraged to follow up with outpatient physical therapy as needed. The patient's Approx Degree of Impairment: 0% has been calculated based on documentation in the [facility] '6 clicks' Inpatient Basic Mobility Short Form.  Research supports that patients with this level of impairment may benefit from home, however would benefit from outpatient physical therapy as needed. Final disposition  will be made by interdisciplinary medical team. Patient received semi-fowlers in bed, agreeable to physical therapy evaluation.  Patient initially with right leg issues, has since resolved, heel to shin intact.     Patient history and/or personal factors that may impact the plan of care include home accessibility concerns. Based on the physical therapy examination of the noted systems and functional activity/participation limitations, the patient presentation is stable given the patient is functioning near baseline level.       Patient End of Session: Needs met;Call light within reach;RN aware of session/findings;All patient questions and concerns addressed    Patient Evaluation Complexity Level:  History Low - no personal factors and/or co-morbidities   Examination of body systems Low -  addressing 1-2 elements   Clinical Presentation Low- Stable   Clinical Decision Making  Low Complexity     Gait Training: 10 minutes  Therapeutic Activity:  13 minutes

## 2024-11-21 NOTE — OCCUPATIONAL THERAPY NOTE
OCCUPATIONAL THERAPY EVALUATION - INPATIENT     Room Number: 454/454-A  Evaluation Date: 2024  Type of Evaluation: Initial       Physician Order: IP Consult to Occupational Therapy  Reason for Therapy: ADL/IADL Dysfunction and Discharge Planning    OCCUPATIONAL THERAPY ASSESSMENT   RN cleared pt for participation in OT session, which was completed in collaboration with PT. Upon arrival, pt was supine in bed and agreeable to activity. No visitors present during session. Pt was left in chair. Call light and all needs left in reach. Handoff given to RN.    Patient is a 49 year old male admitted 2024 for ataxia of RUE.  Prior to admission, pt was independent.  Patient is at baseline. Strength, coordination, vision, sensation intact; appears more muscle memory, motor planning and delayed reaction and follow through of task. Pt reports when he stops and takes time to try the task again; he can do it. No further OT skilled services required.                          Education provided  Educated pt about role of OT and hospital therapy process as well as proper safety techniques including proper hand placement, body mechanics, safety techniques Pt verbalized/demonstrated good carryover.           OCCUPATIONAL THERAPY MEDICAL/SOCIAL HISTORY     Problem List  Principal Problem:    Ataxia of right upper extremity      Past Medical History  Past Medical History:    Diabetes (HCC)    Esophageal reflux    Essential hypertension       Past Surgical History  History reviewed. No pertinent surgical history.    HOME SITUATION  Type of Home: Apartment  Home Layout: One level  Lives With: Spouse                      Drives: Yes  Patient Regularly Uses: None    SUBJECTIVE  \"Its been on and off since Saturday.\"    OCCUPATIONAL THERAPY EXAMINATION      OBJECTIVE  Precautions: None  Fall Risk: Standard fall risk    PAIN ASSESSMENT  Ratin             COORDINATION  Gross Motor: WFL   Fine Motor: WFL     ACTIVITIES OF DAILY  LIVING ASSESSMENT  -PAC ‘6-Clicks’ Inpatient Daily Activity Short Form  How much help from another person does the patient currently need…  -   Putting on and taking off regular lower body clothing?: None  -   Bathing (including washing, rinsing, drying)?: None  -   Toileting, which includes using toilet, bedpan or urinal? : None  -   Putting on and taking off regular upper body clothing?: None  -   Taking care of personal grooming such as brushing teeth?: None  -   Eating meals?: None    AM-PAC Score:  Score: 24  Approx Degree of Impairment: 0%  Standardized Score (AM-PAC Scale): 57.54  CMS Modifier (G-Code): CH      FUNCTIONAL TRANSFER ASSESSMENT  ind  FUNCTIONAL ADL ASSESSMENT  ind    The patient's Approx Degree of Impairment: 0% has been calculated based on documentation in the [facility] '6 clicks' Inpatient Daily Activity Short Form.  Research supports that patients with this level of impairment may benefit from home. Final disposition will be made by interdisciplinary medical team.     Patient Evaluation Complexity Level:   Occupational Profile/Medical History LOW - Brief history including review of medical or therapy records    Specific performance deficits impacting engagement in ADL/IADL LOW  1 - 3 performance deficits    Client Assessment/Performance Deficits LOW - No comorbidities nor modifications of tasks    Clinical Decision Making LOW - Analysis of occupational profile, problem-focused assessments, limited treatment options    Overall Complexity LOW     OT Session Time: 15 minutes           Shante Boggs OT  [facility]  Inpatient Rehabilitation  Occupational Therapy  [phone]

## 2024-11-22 NOTE — PAYOR COMM NOTE
--------------  DISCHARGE REVIEW    Payor: [employer]  Subscriber #:  [id number]  Authorization Number: [id number]    Admit date: 11/20/24  Admit time:  10:01 PM  Discharge Date: 11/21/2024  5:23 PM     Admitting Physician: Tyesha Leonardo MD  Attending Physician:  No att. providers found  Primary Care Physician: Nonstaff, Physician          Discharge Summary Notes        Discharge Summary signed by Stevie Sawant MD at 11/21/2024  3:36 PM       Author: Stevie Sawant MD Specialty: Internal Medicine Author Type: Physician    Filed: 11/21/2024  3:36 PM Date of Service: 11/21/2024 12:17 PM Status: Signed    : Stevie Sawant MD (Physician)         [facility]   INTERNAL MEDICINE DISCHARGE SUMMARY    Name:     Marek Mcqueen  YOB: 1975  MRN:     [id number]    Admission Date:     11/20/2024 Discharge Date:     11/21/24      REASON FOR ADMISSION  Ataxia of right upper extremity    Primary care physician: No primary care provider on file.   Discharging physician: Stevie Sawant MD     HOSPITAL COURSE  DISCHARGE DIAGNOSES  PROCEDURES   Mr. Marek Mcqueen is a 48 y/o M w/ T2DM, HTN, HLD, JAZZ, and GERD who presented for weakness and decreased coordination     Patient following with primary care and endocrinology at [place]. Was recently seen in clinic for dizziness. Thought potentially due to anxiety/panic attack, possibly blood sugar (unsure since patient did not check during event). Dizziness resolved, continued to have on and off difficulty with right arm, sometimes feeling like his coordination was slightly off. Resolves spontaneously after happening. This had not occurred until a few days prior to admission. On admission - got MRI brain wihtout acute findings explaining symptoms. CTA head neck without LVO or significant stenosis. Neuro consulted,  completed EEG without seizure like activity. Was recommended to get LP per neuro, however patient declined at this time. Unclear cause of  symptoms, patient prefers to follow up.     Of note sugars were elevated during admission. Discussed with patient, has endocrinology appointment 11/25/24. Emphasized importance of glucose control. Instructed to follow up with PCP, neuro, and endocrine.     Decreased coordination  - recent episode of dizziness PTA  - on and off decreased coordination causing him to present to ED  - unclear cause after workup - stroke and seizure workup completed per neuro without acute cause:  - MRI brain without acute hemorrhage or infarct  - CTA head/neck without occlusion or significant stenosis  - eeg without seizure activity  - neuro consulted   - would also recommend LP, patient declines   - follow up OP  - monitored on telemetry  - PT/OT ordered - OP Therapy recommended     T2DM c/b hyperglycemia  - A1c 14.8 on admission  - on glipizide, metformin, rybelus  - SSI inpatient , start glargine if remains inpatient  - due to continued elevation in sugars, would recommend starting insulin - patient to discuss with his endocrinologist 11/25 at appointment     Chronic medical conditions:  - HTN: continue lisinopril  - HLD: crestor  - JAZZ: CPAP  - GERD: PPI    CONSULTATIONS   IP CONSULT TO NEUROLOGY    IMPORTANT FOLLOW UP  Marisol Agustin  [address] [place] [address]  [phone]    Schedule an appointment as soon as possible for a visit  Diabetes follow up within 1-3 weeks    Arnold Moore MD  [address] [place] [address]   [phone]  Schedule an appointment as soon as possible for a visit in 1 week(s)       Discharge Instructions         Mr Mcqueen,    Zach were treated for decreased coordination.    Your MRI's did not show any abnormalities    Please continue your medications as prescribed.    Please follow up with PCP and neurology.    Your blood sugars were elevated - you will need further control with your endocrinologist. Please follow up with them as scheduled  on 11/25.              MEDICATIONS AT DISCHARGE     Medication List        CONTINUE taking these medications      glipiZIDE 5 MG Tabs  Commonly known as: Glucotrol     lisinopril 10 MG Tabs  Commonly known as: Zestril     metFORMIN  MG Tb24  Commonly known as: Glucophage XR     pantoprazole 20 MG Tbec  Commonly known as: Protonix     Rybelsus 7 MG Tabs  Generic drug: Semaglutide          PHYSICAL EXAMINATION  Vitals: BP (!) 152/92 (BP Location: Left arm)   Pulse 89   Temp 98.5 °F (36.9 °C) (Oral)   Resp 18   Ht 5' 9\" (1.753 m)   Wt 252 lb (114.3 kg)   SpO2 97%   BMI 37.21 kg/m²   Please see H&P    DISCHARGE CONDITION  Stable    DISCHARGE LOCATION  home    DISCHARGE DIAGNOSES  Weakness  HTN  HLD  T2DM with hyperglycemia      Total time coordinating care: 75 minutes. (Hx, exam, chart review, discussing plan w/ patient/family, nursing/consultants). 50% of the time F2F for counseling, patient education, answering questions & coordination of care.    Patient's prior to admission medications were reviewed, all current medications reviewed. Patient's current and discharge medications have been reconciled and reviewed verbally with the patient and or family members.      Stevie Sawant MD   Internal Medicine - [facility]      Objective:    LABORATORY VALUES   Recent Labs   Lab 11/20/24 1651 11/21/24  0539   * 136   K 4.3 4.0    103   CO2 27.0 27.0   BUN 14 13   CREATSERUM 1.14 0.93   ANIONGAP 6 6   * 308*   CA 9.7 9.1   EGFRCR 79 101    Recent Labs   Lab 11/20/24  1651 11/21/24  0539   WBC 6.2 5.8   HGB 13.3 12.2*   HCT 39.0 34.5*   .0 274.0   MCV 79.1* 80.0   MOPERCENT 8.3 9.7   EOPERCENT 1.5 1.6   BAPERCENT 0.6 0.3      IMAGING, REPORTS, AND DIAGNOSTICS  MRI BRAIN (W+WO) (CPT=70553)    Result Date: 11/20/2024  CONCLUSION:  1. Normal pre and post intravenous gadolinium MRI scan of the brain. 2. Mild ethmoid sinusitis-probably chronic    Dictated by (CST): Elmo  Moises DASILVA MD on 11/20/2024 at 9:44 PM     Finalized by (CST): Moises Borrego MD on 11/20/2024 at 9:51 PM          CTA BRAIN + CTA CAROTIDS (CPT=70496/30000)    Result Date: 11/20/2024  CONCLUSION:  1. No major vessel occlusion, hemodynamically significant stenosis, dissection, AVM or aneurysm. 2. Mild atherosclerosis of internal carotids.  No hemodynamically significant stenosis. 3. No acute intracranial finding. 4. Mild right ethmoid sinusitis.     Dictated by (CST): Moises Borrego MD on 11/20/2024 at 6:57 PM     Finalized by (CST): Moises Borrego MD on 11/20/2024 at 7:04 PM          MRI BRAIN WO ACUTE (3) SEQUENCE (CPT=70551)    Result Date: 11/20/2024  CONCLUSION:  1. No acute infarct or hemorrhage. 2. Probable artifact related to tortuous vertebral artery accounting for inconsistent signal abnormality in the left side of the medulla.  If patient has persistent unexplained symptoms, follow-up complete pre and post intravenous gadolinium MRI scan recommended for confirmation.     Dictated by (CST): Moises Borrego MD on 11/20/2024 at 6:52 PM     Finalized by (CST): Moises Borrego MD on 11/20/2024 at 6:57 PM                 Electronically signed by Stevie Sawant MD on 11/21/2024  3:36 PM         REVIEWER COMMENTS

## 2024-11-23 NOTE — CONSULTS
[facility]  [address] [place] [address], [address]  [facility] [address]  [phone]            Marek Mcqueen Patient Status:  Inpatient    1975 MRN [id number]   Location [facility] 4W/SW/SE Attending No att. providers found   Hosp Day # 1 PCP PHYSICIAN NONSTAFF     Date of Admission:  2024  Date of Consult:  2024  Reason for Consultation:   Abnormal movements in right upper extremity-      History of Present Illness:   Patient is a 49 year old male who was admitted to the hospital for Ataxia of right upper extremity:    History was obtained from the patient himself as well as from the medical record.  He told me he has been having abnormal movements in his right upper extremity, he is right-handed, he told me his \"right hand is doing what ever it wants\".  This occurs intermittently sometimes multiple times during the day. No auras.  He does not necessarily feel weak in his right upper extremity but when he tries to achieve certain motion the right hand \" would not listen\".  Denies any rhythmic jerking in the right upper extremity, he usually relaxes and take a break for a minute or 2 and then his right hand returned to the baseline and he is able to use it normally again.  Denies any spasms in the right upper extremity.  No problems with the right leg, no jerking or twitching or facial droop on the right side.  Denies headaches. No loss of consciousness.  NO history of seizures or strokes.  His past medical history significant for diabetes, his HbA1c on this admission is 14.8    Past Medical History  Past Medical History:    Diabetes (HCC)    Esophageal reflux    Essential hypertension       Past Surgical History  History reviewed. No pertinent surgical history.    Family History  No family history on file.    Social History  Pediatric History   Patient Parents    Not on file     Other Topics Concern    Not on file   Social History Narrative    Not on file            Current Medications:  No current facility-administered medications for this encounter.     No medications prior to admission.       Allergies  Allergies[1]    Review of Systems:   As in HPI, the rest of the 14 system review was done and was negative    Physical Exam:     Vitals:    11/20/24 2207 11/21/24 0540 11/21/24 1148 11/21/24 1229   BP: (!) 168/99 (!) 152/92  132/73   Pulse: 82 95 89    Resp: 20 18  16   Temp: 98.4 °F (36.9 °C) 98.5 °F (36.9 °C)  98.6 °F (37 °C)   TempSrc: Oral Oral  Oral   SpO2: 98% 97%  99%   Weight: 252 lb (114.3 kg)      Height:           General: No apparent distress, well nourished, well groomed.  Head- Normocephalic, atraumatic  Eyes- No redness or swelling  ENT- Hearing intake, smell preserved, normal glutition  Neck- No masses or adenopathy  Cv: pulses were palpable and normal, no cyanosis or edema     Neurological:     Mental Status- Alert and oriented x3.  Normal attention span and concentration  Thought process intact  Memory intact- recent and remote  Mood intact  Fund of knowledge appropriate for education and age    Language intact including: comprehension, naming, repetition, vocabulary    Cranial Nerves:  II.- Visual fields full to confrontation  III, IV, VI- EOM intact, MARY ANN  V. Facial sensation intact  VII. Face symmetric, no facial weakness  VIII. Hearing intact.  IX. Palate elevates symmetrically.  XI. Shoulder shrug is intact  XII. Tongue is midline    Motor Exam:  Muscle tone normal  No atrophy or fasciculations  Strength- upper extremities 5/5 proximally and distally                  - lower  extremities 5/5 proximally and distally    Sensory Exam:  Light touch sensation- intact in all 4 extremities    Deep Tendon Reflexes:  2+ and symmetric  No clonus  No Babinski sign    Coordination:  Finger to nose intact      Gait:  Normal gait    Results:     Laboratory Data:  Lab Results   Component Value Date    WBC 5.8 11/21/2024    HGB 12.2 (L) 11/21/2024    HCT 34.5 (L)  11/21/2024    .0 11/21/2024    CREATSERUM 0.93 11/21/2024    BUN 13 11/21/2024     11/21/2024    K 4.0 11/21/2024     11/21/2024    CO2 27.0 11/21/2024     (H) 11/21/2024    CA 9.1 11/21/2024         Imaging:    MRI BRAIN (W+WO) (CPT=70553)    Result Date: 11/20/2024  CONCLUSION:  1. Normal pre and post intravenous gadolinium MRI scan of the brain. 2. 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   5- FU in outpatient neurology clinic in 4-6 weeks    Thank you for allowing me to participate in the care of your patient.    Celena Mason MD              [1] No Known Allergies

## 2025-05-14 ENCOUNTER — HOSPITAL ENCOUNTER (EMERGENCY)
Facility: HOSPITAL | Age: 50
Discharge: HOME | End: 2025-05-14
Attending: EMERGENCY MEDICINE
Payer: COMMERCIAL

## 2025-05-14 ENCOUNTER — APPOINTMENT (OUTPATIENT)
Dept: CT IMAGING | Facility: HOSPITAL | Age: 50
End: 2025-05-14
Attending: EMERGENCY MEDICINE
Payer: COMMERCIAL

## 2025-05-14 VITALS
HEART RATE: 92 BPM | SYSTOLIC BLOOD PRESSURE: 182 MMHG | TEMPERATURE: 97 F | BODY MASS INDEX: 37.94 KG/M2 | WEIGHT: 265 LBS | OXYGEN SATURATION: 100 % | HEIGHT: 70 IN | RESPIRATION RATE: 18 BRPM | DIASTOLIC BLOOD PRESSURE: 97 MMHG

## 2025-05-14 DIAGNOSIS — K02.9 DENTAL CARIES: ICD-10-CM

## 2025-05-14 DIAGNOSIS — I10 POORLY-CONTROLLED HYPERTENSION: ICD-10-CM

## 2025-05-14 DIAGNOSIS — E11.65 TYPE 2 DIABETES MELLITUS WITH HYPERGLYCEMIA, WITHOUT LONG-TERM CURRENT USE OF INSULIN (HCC): ICD-10-CM

## 2025-05-14 DIAGNOSIS — K04.7 PERIAPICAL ABSCESS: Primary | ICD-10-CM

## 2025-05-14 LAB
ANION GAP SERPL CALC-SCNC: 10 MMOL/L (ref 0–18)
BASOPHILS # BLD AUTO: 0.05 X10(3) UL (ref 0–0.2)
BASOPHILS NFR BLD AUTO: 0.8 %
BUN BLD-MCNC: 12 MG/DL (ref 9–23)
BUN/CREAT SERPL: 9.4 (ref 10–20)
CALCIUM BLD-MCNC: 8.6 MG/DL (ref 8.7–10.4)
CHLORIDE SERPL-SCNC: 97 MMOL/L (ref 98–112)
CO2 SERPL-SCNC: 24 MMOL/L (ref 21–32)
CREAT BLD-MCNC: 1.28 MG/DL (ref 0.7–1.3)
DEPRECATED RDW RBC AUTO: 37.2 FL (ref 35.1–46.3)
EGFRCR SERPLBLD CKD-EPI 2021: 69 ML/MIN/1.73M2 (ref 60–?)
EOSINOPHIL # BLD AUTO: 0.1 X10(3) UL (ref 0–0.7)
EOSINOPHIL NFR BLD AUTO: 1.6 %
ERYTHROCYTE [DISTWIDTH] IN BLOOD BY AUTOMATED COUNT: 12.9 % (ref 11–15)
GLUCOSE BLD-MCNC: 427 MG/DL (ref 70–99)
GLUCOSE BLDC GLUCOMTR-MCNC: 372 MG/DL (ref 70–99)
GLUCOSE BLDC GLUCOMTR-MCNC: 422 MG/DL (ref 70–99)
HCT VFR BLD AUTO: 34.5 % (ref 39–53)
HGB BLD-MCNC: 11.5 G/DL (ref 13–17.5)
IMM GRANULOCYTES # BLD AUTO: 0.01 X10(3) UL (ref 0–1)
IMM GRANULOCYTES NFR BLD: 0.2 %
LYMPHOCYTES # BLD AUTO: 1.83 X10(3) UL (ref 1–4)
LYMPHOCYTES NFR BLD AUTO: 28.8 %
MCH RBC QN AUTO: 26.7 PG (ref 26–34)
MCHC RBC AUTO-ENTMCNC: 33.3 G/DL (ref 31–37)
MCV RBC AUTO: 80 FL (ref 80–100)
MONOCYTES # BLD AUTO: 0.49 X10(3) UL (ref 0.1–1)
MONOCYTES NFR BLD AUTO: 7.7 %
NEUTROPHILS # BLD AUTO: 3.88 X10 (3) UL (ref 1.5–7.7)
NEUTROPHILS # BLD AUTO: 3.88 X10(3) UL (ref 1.5–7.7)
NEUTROPHILS NFR BLD AUTO: 60.9 %
OSMOLALITY SERPL CALC.SUM OF ELEC: 290 MOSM/KG (ref 275–295)
PLATELET # BLD AUTO: 348 10(3)UL (ref 150–450)
POTASSIUM SERPL-SCNC: 4.3 MMOL/L (ref 3.5–5.1)
RBC # BLD AUTO: 4.31 X10(6)UL (ref 4.3–5.7)
SODIUM SERPL-SCNC: 131 MMOL/L (ref 136–145)
WBC # BLD AUTO: 6.4 X10(3) UL (ref 4–11)

## 2025-05-14 PROCEDURE — 99284 EMERGENCY DEPT VISIT MOD MDM: CPT

## 2025-05-14 PROCEDURE — 96374 THER/PROPH/DIAG INJ IV PUSH: CPT

## 2025-05-14 PROCEDURE — 70491 CT SOFT TISSUE NECK W/DYE: CPT | Performed by: EMERGENCY MEDICINE

## 2025-05-14 PROCEDURE — 82962 GLUCOSE BLOOD TEST: CPT

## 2025-05-14 PROCEDURE — 96361 HYDRATE IV INFUSION ADD-ON: CPT

## 2025-05-14 PROCEDURE — 85025 COMPLETE CBC W/AUTO DIFF WBC: CPT | Performed by: EMERGENCY MEDICINE

## 2025-05-14 PROCEDURE — 80048 BASIC METABOLIC PNL TOTAL CA: CPT | Performed by: EMERGENCY MEDICINE

## 2025-05-14 RX ORDER — HYDROCODONE BITARTRATE AND ACETAMINOPHEN 5; 325 MG/1; MG/1
1 TABLET ORAL EVERY 6 HOURS PRN
Qty: 10 TABLET | Refills: 0 | Status: SHIPPED | OUTPATIENT
Start: 2025-05-14

## 2025-05-14 RX ORDER — KETOROLAC TROMETHAMINE 15 MG/ML
15 INJECTION, SOLUTION INTRAMUSCULAR; INTRAVENOUS ONCE
Status: COMPLETED | OUTPATIENT
Start: 2025-05-14 | End: 2025-05-14

## 2025-05-14 NOTE — DISCHARGE INSTRUCTIONS
Augmentin as prescribed. Norco as needed for pain. You had elevated blood pressure today and you need to follow up with your doctor for a repeat blood pressure check and further discussion of lifestyle modifications that include Weight Reduction - Dietary Sodium Restriction - Increased Physical Activity and Moderation in alcohol (ETOH) Consumption. If possible check your pressure at home and keep a blood pressure log to bring to your physician. You must discuss with your Dr, how to better control your diabetes.

## 2025-05-14 NOTE — ED INITIAL ASSESSMENT (HPI)
Pt arrives through triage with       complaints of left side tooth pain. Loose tooth. Can't see dentist until tomorrow

## 2025-05-14 NOTE — ED PROVIDER NOTES
Patient Seen in: [facility] Emergency Department      History     Chief Complaint   Patient presents with    Dental Problem     Stated Complaint: Dental Pain    Subjective:   HPI  History of Present Illness            Patient presents emergency department planing of dental pain.  He states that he has a loose tooth its become painful on the right lower jaw.  He states that he had a CT scan done a few days ago in preparation for possible dental implants and he was told that he had an infection.  He denies fever, chills, nausea or vomiting.  He states he has diffuse swelling around his gums in his lower jaw.  There is no difficulty swallowing or breathing.  He does have a history of diabetes.      Objective:     Past Medical History:    Diabetes (HCC)    Esophageal reflux    Essential hypertension              History reviewed. No pertinent surgical history.             Social History     Socioeconomic History    Marital status:    Tobacco Use    Smoking status: Never    Smokeless tobacco: Never   Vaping Use    Vaping status: Never Used   Substance and Sexual Activity    Alcohol use: Never    Drug use: Never     Social Drivers of Health     Food Insecurity: No Food Insecurity (3/4/2025)    Received from [facility]    Hunger Vital Sign     Worried About Running Out of Food in the Last Year: Never true     Ran Out of Food in the Last Year: Never true   Recent Concern: Food Insecurity - Food Insecurity Present (2/12/2025)    Received from [facility]    Hunger Vital Sign     Worried About Running Out of Food in the Last Year: Sometimes true     Ran Out of Food in the Last Year: Sometimes true   Transportation Needs: No Transportation Needs (3/4/2025)    Received from [facility]    PRAPARE - Transportation     Lack of Transportation (Medical): No     Lack of Transportation (Non-Medical): No   Housing Stability: Low Risk  (3/4/2025)     Received from [facility]    Housing Stability Vital Sign     Unable to Pay for Housing in the Last Year: No     Number of Times Moved in the Last Year: 0     Homeless in the Last Year: No                                Physical Exam     ED Triage Vitals [05/14/25 1211]   BP (!) 177/99   Pulse 99   Resp 18   Temp 97.3 °F (36.3 °C)   Temp src Temporal   SpO2 97 %   O2 Device None (Room air)       Current Vitals:   Vital Signs  BP: (!) 177/99  Pulse: 99  Resp: 18  Temp: 97.3 °F (36.3 °C)  Temp src: Temporal  MAP (mmHg): (!) 125    Oxygen Therapy  SpO2: 97 %  O2 Device: None (Room air)          Physical Exam  Vitals and nursing note reviewed.   Constitutional:       General: He is not in acute distress.     Appearance: He is well-developed.   HENT:      Head: Normocephalic.      Nose: Nose normal.      Mouth/Throat:      Comments: There is diffuse gingival inflammatory changes with dental caries and loose tooth noted in the right lower mandible.  There is a slight amount of fullness to the submental space but there is no firm lifting of the tongue or tenderness on external or internal palpation.  There is no trismus.  There is no difficulty swallowing or breathing.  Eyes:      Conjunctiva/sclera: Conjunctivae normal.   Cardiovascular:      Rate and Rhythm: Normal rate and regular rhythm.      Heart sounds: No murmur heard.  Pulmonary:      Effort: Pulmonary effort is normal. No respiratory distress.      Breath sounds: Normal breath sounds.   Abdominal:      General: There is no distension.      Palpations: Abdomen is soft.      Tenderness: There is no abdominal tenderness.   Musculoskeletal:         General: No tenderness. Normal range of motion.      Cervical back: Normal range of motion and neck supple.   Skin:     General: Skin is warm and dry.      Findings: No rash.   Neurological:      Mental Status: He is alert and oriented to person, place, and time.                   ED Course     Labs  Reviewed   CBC WITH DIFFERENTIAL WITH PLATELET - Abnormal; Notable for the following components:       Result Value    HGB 11.5 (*)     HCT 34.5 (*)     All other components within normal limits   BASIC METABOLIC PANEL (8) - Abnormal; Notable for the following components:    Glucose 427 (*)     Sodium 131 (*)     Chloride 97 (*)     BUN/CREA Ratio 9.4 (*)     Calcium, Total 8.6 (*)     All other components within normal limits   POCT GLUCOSE - Abnormal; Notable for the following components:    POC Glucose  422 (*)     All other components within normal limits          Results                                MDM              Medical Decision Making  Differential diagnosis considered for periapical abscess, gingivitis, dental caries.    Problems Addressed:  Dental caries: acute illness or injury  Periapical abscess: acute illness or injury    Amount and/or Complexity of Data Reviewed  Labs: ordered. Decision-making details documented in ED Course.     Details: Elevated blood sugar, CT scan shows periapical abscess with multiple dental caries.  Normal kidney function  Radiology: ordered and independent interpretation performed. Decision-making details documented in ED Course.  Discussion of management or test interpretation with external provider(s): Patient requested to speak with me stating he absolutely needs to leave to go to work.  He understands his blood sugars excessively high.  I gave him a dose of oral Augmentin as he declined  waiting for the IV Unasyn to be given prior to discharge.  He was given insulin, refused recheck of blood sugar.  Also declined oral Norvasc which he did not take today and stated he will take it when he gets home.  Understands that his blood pressure is dangerously high and his blood sugars are extremely uncontrolled yet states that he cannot stay any longer and needs to leave to get to work.  Understands risks of noncompliance with medications.    Risk  Prescription drug  management.        Disposition and Plan     Clinical Impression:  1. Periapical abscess    2. Dental caries    3. Type 2 diabetes mellitus with hyperglycemia, without long-term current use of insulin (HCC)    4. Poorly-controlled hypertension         Disposition:  Discharge  5/14/2025  3:28 pm    Follow-up:  Nonstaff, Physician    Follow up      your dentist tomorrow.    Follow up            Medications Prescribed:  Current Discharge Medication List        START taking these medications    Details   amoxicillin clavulanate 875-125 MG Oral Tab Take 1 tablet by mouth 2 (two) times daily for 10 days.  Qty: 20 tablet, Refills: 0    Associated Diagnoses: Periapical abscess; Dental caries      HYDROcodone-acetaminophen 5-325 MG Oral Tab Take 1 tablet by mouth every 6 (six) hours as needed.  Qty: 10 tablet, Refills: 0    Associated Diagnoses: Periapical abscess; Dental caries                   Supplementary Documentation:

## (undated) NOTE — LETTER
Date & Time: 5/14/2025, 3:36 PM  Patient: Marek Mcqueen  Encounter Provider(s):    Josh Patel MD       To Whom It May Concern:    Marek Mcqueen was seen and treated in our department on 5/14/2025. He can return to work.    If you have any questions or concerns, please do not hesitate to call.        _____________________________  Physician/APC Signature

## (undated) NOTE — LETTER
Date & Time: 11/21/2024, 1:14 PM  Patient: Marek Mcqueen  Encounter Provider(s):    Chon Vazquez MD Amatul, Habeeb S, MD Shaw, Stevie RIVERA MD       To Whom It May Concern:    Marek Mcqueen was seen, admitted, and treated in our department on 11/20/2024 - 11/21/2024. He should not return to work until 11/25/2024  to allow for full recovery.    If you have any questions or concerns, please do not hesitate to call.      Stevie Sawant MD